# Patient Record
(demographics unavailable — no encounter records)

---

## 2025-01-16 NOTE — BEGINNING OF VISIT
[0] : 2) Feeling down, depressed, or hopeless: Not at all (0) [PHQ-2 Negative] : PHQ-2 Negative [[id number]] : 0 [Pain Scale: ___] : On a scale of 1-10, today the patient's pain is a(n) [unfilled]. [Former] : Former [20 or more] : 20 or more [> 15 Years] : > 15 Years [Date Discussed (MM/DD/YY): ___] : Discussed: [unfilled] [Reviewed, no changes] : Reviewed, no changes

## 2025-01-16 NOTE — BEGINNING OF VISIT
[0] : 2) Feeling down, depressed, or hopeless: Not at all (0) [PHQ-2 Negative] : PHQ-2 Negative [[id number]] : 0 [Pain Scale: ___] : On a scale of 1-10, today the patient's pain is a(n) [unfilled]. [Former] : Former [20 or more] : 20 or more [> 15 Years] : > 15 Years [Date Discussed (MM/DD/YY): ___] : Discussed: [unfilled] [Reviewed, no changes] : Reviewed, no changes

## 2025-01-16 NOTE — BEGINNING OF VISIT
[0] : 2) Feeling down, depressed, or hopeless: Not at all (0) [PHQ-2 Negative] : PHQ-2 Negative [[id number]] : 0 [Pain Scale: ___] : On a scale of 1-10, today the patient's pain is a(n) [unfilled]. [Former] : Former [20 or more] : 20 or more [> 15 Years] : > 15 Years [Date Discussed (MM/DD/YY): ___] : Discussed: [unfilled] [Reviewed, no changes] : Reviewed, no changes

## 2025-01-16 NOTE — BEGINNING OF VISIT
[0] : 2) Feeling down, depressed, or hopeless: Not at all (0) [PHQ-2 Negative] : PHQ-2 Negative [[id number]] : 0 [Pain Scale: ___] : On a scale of 1-10, today the patient's pain is a(n) [unfilled]. [Former] : Former [20 or more] : 20 or more [> 15 Years] : > 15 Years [Date Discussed (MM/DD/YY): ___] : Discussed: [unfilled] [Reviewed, no changes] : Reviewed, no changes

## 2025-01-17 NOTE — CONSULT LETTER
[Dear  ___] : Dear  [unfilled], [Consult Letter:] : I had the pleasure of evaluating your patient, [unfilled]. [Please see my note below.] : Please see my note below. [Consult Closing:] : Thank you very much for allowing me to participate in the care of this patient.  If you have any questions, please do not hesitate to contact me. [Sincerely,] : Sincerely, [FreeTextEntry3] : Melida Zapata MD\par  [facility] [place] at [facility]\par

## 2025-01-17 NOTE — REVIEW OF SYSTEMS
[SOB on Exertion] : shortness of breath during exertion [FreeTextEntry5] : leaky valve [de-identified] : right arm lymphedema - trace [Joint Stiffness] : joint stiffness [Muscle Pain] : muscle pain [Muscle Weakness] : muscle weakness [Negative] : Integumentary [Fatigue] : no fatigue [FreeTextEntry9] : neck pain

## 2025-01-17 NOTE — CONSULT LETTER
[Dear  ___] : Dear  [unfilled], [Consult Letter:] : I had the pleasure of evaluating your patient, [unfilled]. [Please see my note below.] : Please see my note below. [Consult Closing:] : Thank you very much for allowing me to participate in the care of this patient.  If you have any questions, please do not hesitate to contact me. [Sincerely,] : Sincerely, [FreeTextEntry3] : Melida Zapata MD\par  [facility] [place] at [facility]\par

## 2025-01-17 NOTE — ASSESSMENT
[FreeTextEntry1] : BCA - stage 1- 1994, s/p mastectomy, right implant, h/o rupture, replacement in the past- since then recurrent lymphedema, intermittent right upper extremity.  Improves with PT. Patient with implant over 10 years - not interested in exchanging Mammogram Aug 2021- due Aug 2022- Rx given MRI Jan 2020- implant 26 yo, stable   [employer] COVID vaccinated Flu vaccine - done   vit D deficiency - vit D oral 2k, monitor the dose   vit B12 borderline in the past  - oral B12 1000 mcg, stop as B12 > 2000 mg anemia- hg 11.4 - check irons today and B12 today   last bone density May 2019- due now  - due now, rx given   T-score -1.2 Risk factors: postmenopausal  Recommended: 1. Vitamin D 2. Calcium supplement 500mg 3. Weight bearing exercises  # Neck pain with radiculopathy - MRI neck   CT Jan 2021 -CT of the chest- discrete RUL lung nodule decreased in size. Other small nodular densities which were new on that study are stable to slightly less prominent.  There is a new 7 x 4mm irregular density within the right upper lobe which is likely infectious/ inflammatory with a significant lung nodule considered less likely but not excluded.  Other small nodular densities demonstrate long term stability.  F/U noncontrast CT in 3 months recommended.-- CT - scan repeated 4/14/2021, some nodules resolved, but given smoking will continue surveillance  - episodes of SOB - undergoing pulmonary evaluation CT chest - reviewed - slight in bud inflammatory process, pulmonary follow up, episodes of SOB - 1.5 packs x 35 years, quit 35 years ago - pt to return in 6 months, cbc, chem, cea ca 27.29 irons  Blood drawn in office. Ordered and reviewed.

## 2025-01-17 NOTE — REVIEW OF SYSTEMS
[SOB on Exertion] : shortness of breath during exertion [FreeTextEntry5] : leaky valve [de-identified] : right arm lymphedema - trace [Joint Stiffness] : joint stiffness [Muscle Pain] : muscle pain [Muscle Weakness] : muscle weakness [Negative] : Integumentary [Fatigue] : no fatigue [FreeTextEntry9] : neck pain

## 2025-01-17 NOTE — ASSESSMENT
[FreeTextEntry1] : BCA - stage 1- 1994, s/p mastectomy, right implant, h/o rupture, replacement in the past- since then recurrent lymphedema, intermittent right upper extremity.  Improves with PT. Patient with implant over 10 years - not interested in exchanging Mammogram Aug 2021- due Aug 2022- Rx given MRI Jan 2020- implant 24 yo, stable   [employer] COVID vaccinated Flu vaccine - done   vit D deficiency - vit D oral 2k, monitor the dose   vit B12 borderline in the past  - oral B12 1000 mcg, stop as B12 > 2000 mg anemia- hg 11.4 - check irons today and B12 today   last bone density May 2019- due now  - due now, rx given   T-score -1.2 Risk factors: postmenopausal  Recommended: 1. Vitamin D 2. Calcium supplement 500mg 3. Weight bearing exercises  # Neck pain with radiculopathy - MRI neck   CT Jan 2021 -CT of the chest- discrete RUL lung nodule decreased in size. Other small nodular densities which were new on that study are stable to slightly less prominent.  There is a new 7 x 4mm irregular density within the right upper lobe which is likely infectious/ inflammatory with a significant lung nodule considered less likely but not excluded.  Other small nodular densities demonstrate long term stability.  F/U noncontrast CT in 3 months recommended.-- CT - scan repeated 4/14/2021, some nodules resolved, but given smoking will continue surveillance  - episodes of SOB - undergoing pulmonary evaluation CT chest - reviewed - slight in bud inflammatory process, pulmonary follow up, episodes of SOB - 1.5 packs x 35 years, quit 35 years ago - pt to return in 6 months, cbc, chem, cea ca 27.29 irons  Blood drawn in office. Ordered and reviewed.

## 2025-01-17 NOTE — HISTORY OF PRESENT ILLNESS
[Disease: _____________________] : Disease: [unfilled] [AJCC Stage: ____] : AJCC Stage: [unfilled] [de-identified] : BCA stage 1 1994 right mastectomy.  h/o ruptured implant in the past.  Developed lymphedema Rt arm.  Therapies in the past, minimal result.  Mild swelling now [de-identified] : Patient presents today for follow up of breast cancer- lung nodule - anemia.  Patient states that she has severe pain in the right side of her neck and shoulder.  Last DEXA 2019

## 2025-01-17 NOTE — HISTORY OF PRESENT ILLNESS
[Disease: _____________________] : Disease: [unfilled] [AJCC Stage: ____] : AJCC Stage: [unfilled] [de-identified] : BCA stage 1 1994 right mastectomy.  h/o ruptured implant in the past.  Developed lymphedema Rt arm.  Therapies in the past, minimal result.  Mild swelling now [de-identified] : Patient presents today for follow up of breast cancer- lung nodule - anemia.  Patient states that she has severe pain in the right side of her neck and shoulder.  Last DEXA 2019

## 2025-01-17 NOTE — PHYSICAL EXAM
[Fully active, able to carry on all pre-disease performance without restriction] : Status 0 - Fully active, able to carry on all pre-disease performance without restriction [Normal] : affect appropriate [de-identified] : right mastectomy with reconstruction

## 2025-01-17 NOTE — REVIEW OF SYSTEMS
[SOB on Exertion] : shortness of breath during exertion [FreeTextEntry5] : leaky valve [de-identified] : right arm lymphedema - trace [Joint Stiffness] : joint stiffness [Muscle Pain] : muscle pain [Muscle Weakness] : muscle weakness [Negative] : Integumentary [Fatigue] : no fatigue [FreeTextEntry9] : neck pain

## 2025-01-17 NOTE — DISCUSSION/SUMMARY
[FreeTextEntry1] : Attempted to return patients call x 3 left voicemails- mailed rx for aug mammogram and CT chest to her home

## 2025-01-17 NOTE — PHYSICAL EXAM
[Fully active, able to carry on all pre-disease performance without restriction] : Status 0 - Fully active, able to carry on all pre-disease performance without restriction [Normal] : affect appropriate [de-identified] : right mastectomy with reconstruction

## 2025-01-17 NOTE — PHYSICAL EXAM
[Fully active, able to carry on all pre-disease performance without restriction] : Status 0 - Fully active, able to carry on all pre-disease performance without restriction [Normal] : affect appropriate [de-identified] : right mastectomy with reconstruction

## 2025-01-17 NOTE — HISTORY OF PRESENT ILLNESS
[Disease: _____________________] : Disease: [unfilled] [AJCC Stage: ____] : AJCC Stage: [unfilled] [de-identified] : BCA stage 1 1994 right mastectomy.  h/o ruptured implant in the past.  Developed lymphedema Rt arm.  Therapies in the past, minimal result.  Mild swelling now [de-identified] : Patient presents today for follow up of breast cancer- lung nodule - anemia.  Patient states that she has severe pain in the right side of her neck and shoulder.  Last DEXA 2019

## 2025-01-17 NOTE — CONSULT LETTER
[Dear  ___] : Dear  [unfilled], [Consult Letter:] : I had the pleasure of evaluating your patient, [unfilled]. [Please see my note below.] : Please see my note below. [Consult Closing:] : Thank you very much for allowing me to participate in the care of this patient.  If you have any questions, please do not hesitate to contact me. [Sincerely,] : Sincerely, [FreeTextEntry3] : Melida Zapata MD\par  [facility] [place] at [facility]\par

## 2025-01-17 NOTE — REVIEW OF SYSTEMS
[SOB on Exertion] : shortness of breath during exertion [FreeTextEntry5] : leaky valve [de-identified] : right arm lymphedema - trace [Joint Stiffness] : joint stiffness [Muscle Pain] : muscle pain [Muscle Weakness] : muscle weakness [Negative] : Integumentary [Fatigue] : no fatigue [FreeTextEntry9] : neck pain

## 2025-01-17 NOTE — PHYSICAL EXAM
[Fully active, able to carry on all pre-disease performance without restriction] : Status 0 - Fully active, able to carry on all pre-disease performance without restriction [Normal] : affect appropriate [de-identified] : right mastectomy with reconstruction

## 2025-01-17 NOTE — CONSULT LETTER
[Dear  ___] : Dear  [unfilled], [Consult Letter:] : I had the pleasure of evaluating your patient, [unfilled]. [Please see my note below.] : Please see my note below. [Consult Closing:] : Thank you very much for allowing me to participate in the care of this patient.  If you have any questions, please do not hesitate to contact me. [Sincerely,] : Sincerely, [FreeTextEntry3] : Melida Zapata MD\par  [facility] [place] at [facility]\par

## 2025-01-17 NOTE — HISTORY OF PRESENT ILLNESS
[Disease: _____________________] : Disease: [unfilled] [AJCC Stage: ____] : AJCC Stage: [unfilled] [de-identified] : BCA stage 1 1994 right mastectomy.  h/o ruptured implant in the past.  Developed lymphedema Rt arm.  Therapies in the past, minimal result.  Mild swelling now [de-identified] : Patient presents today for follow up of breast cancer- lung nodule - anemia.  Patient states that she has severe pain in the right side of her neck and shoulder.  Last DEXA 2019

## 2025-03-27 NOTE — PHYSICAL EXAM
[General Appearance - Alert] : alert [General Appearance - In No Acute Distress] : in no acute distress [Fluency] : fluency intact [Comprehension] : comprehension intact [Cranial Nerves Optic (II)] : visual acuity intact bilaterally,  pupils equal round and reactive to light [Cranial Nerves Oculomotor (III)] : extraocular motion intact [Cranial Nerves Trigeminal (V)] : facial sensation intact symmetrically [Cranial Nerves Facial (VII)] : face symmetrical [Cranial Nerves Vestibulocochlear (VIII)] : hearing was intact bilaterally [Cranial Nerves Glossopharyngeal (IX)] : tongue and palate midline [Cranial Nerves Accessory (XI - Cranial And Spinal)] : head turning and shoulder shrug symmetric [Cranial Nerves Hypoglossal (XII)] : there was no tongue deviation with protrusion [Motor Strength] : muscle strength was normal in all four extremities [Sensation Tactile Decrease] : light touch was intact [Abnormal Walk] : normal gait [Normal] : normal [3+] : Triceps left 3+ [2+] : Brachioradialis right 2+ [FreeTextEntry6] : left HF 4+ [de-identified] : myelopathic gait

## 2025-03-27 NOTE — ASSESSMENT
[FreeTextEntry1] : DANYELLE ALLEN is a 93 year old female with a PMH of breast cancer stage 1 right mastectomy and right implant, macular degeneration, lung nodule, last DEXA 2019 T score -1.2, Vitamin B12 deficiency, heart murmur, aortic valve insufficiency, who presents to the office today for neurosurgical consultation at the request of Dr. Zapata due to cervical stenosis.  I reviewed her MRI cervical spine in the office today. She has multilevel cervical spondylosis, most significant at C3-4, C4-5, and C5-6 where there is spinal cord compression and resulting foraminal stenosis as well.   I have discussed the natural history and treatment options for cervical myelopathy with the patient. I explained the indications for observation, conservative management, medical management, physical therapy, pain management approaches and surgery. I explained the different types and surgical approaches including anterior and posterior approaches as well as decompression only procedures and instrumented fusions.   In the end, I recommend additional imaging in the form of upright cervical spine X-rays with flexion and extension views to demonstrate the regional spinal alignment and to rule out any visible dynamic instability. The patient may return to the office once imaging completed for further treatment planning. I encouraged her to begin conversations with her PCP as well as cardiologist regarding medical optimization and possible surgical planning. She was also advised to reach out to us sooner if she is to develop weakness or bowel/bladder symptoms.  I spent 60 minutes relative to this patient encounter.

## 2025-03-27 NOTE — HISTORY OF PRESENT ILLNESS
[de-identified] : DANYELLE ALLEN is a 93 year old female with a PMH of breast cancer stage 1 right mastectomy and right implant, heart murmur, macular degeneration, lung nodule, last DEXA 2019 T score -1.2, Vitamin B12 deficiency, aortic valve insufficiency, who presents to the office today for neurosurgical consultation at the request of Dr. Zapata due to cervical stenosis.  The pain is characterized as sharp and tingling in nature.  It started few months ago. It is exacerbated by lying flat and relieved by lying on her side with her hand under her head.  It radiates from right side of neck into shoulder and right Tricep. She reports dexterity issues left> right hand (started a few months ago with buttons, zippers.  There has been no known trauma precipitating the pain.  The patient denies numbness of extremities, weakness of extremities, bowel or bladder incontinence. Endorses gait disturbance for the past few months with worsening balance, described as her brain and feet don't communicate.  She has tried pain medications including OTC in the last month without relief.  She has undergone imaging in the form of MRI cervical spine which revealed multilevel disc disease and degenerative changes. Spinal stenosis and neural foraminal stenosis at the C3-C4, C4-C5, and C5-C6. Cord compression at the C4-C5 level. Deformity of the cord at the C3-C4 and C5-C6 levels (see detailed report below).  Surg Hx:  right breast mastectomy, L5-L6 microdiscectomy  Meds:    Aspirin 81 mg, Epinephrine, Coreg, Furosemide, B12, Rosuvastatin, Vitamin D, Perservision  Allergies: cortisone, iodine  Soc Hx: Former smoker 1.5 packs x 35 years quit 40 years ago, daily cocktail EtOH, lives with  (has Parkinson's)

## 2025-03-27 NOTE — HISTORY OF PRESENT ILLNESS
[de-identified] : DANYELLE ALLEN is a 93 year old female with a PMH of breast cancer stage 1 right mastectomy and right implant, heart murmur, macular degeneration, lung nodule, last DEXA 2019 T score -1.2, Vitamin B12 deficiency, aortic valve insufficiency, who presents to the office today for neurosurgical consultation at the request of Dr. Zapata due to cervical stenosis.  The pain is characterized as sharp and tingling in nature.  It started few months ago. It is exacerbated by lying flat and relieved by lying on her side with her hand under her head.  It radiates from right side of neck into shoulder and right Tricep. She reports dexterity issues left> right hand (started a few months ago with buttons, zippers.  There has been no known trauma precipitating the pain.  The patient denies numbness of extremities, weakness of extremities, bowel or bladder incontinence. Endorses gait disturbance for the past few months with worsening balance, described as her brain and feet don't communicate.  She has tried pain medications including OTC in the last month without relief.  She has undergone imaging in the form of MRI cervical spine which revealed multilevel disc disease and degenerative changes. Spinal stenosis and neural foraminal stenosis at the C3-C4, C4-C5, and C5-C6. Cord compression at the C4-C5 level. Deformity of the cord at the C3-C4 and C5-C6 levels (see detailed report below).  Surg Hx:  right breast mastectomy, L5-L6 microdiscectomy  Meds:    Aspirin 81 mg, Epinephrine, Coreg, Furosemide, B12, Rosuvastatin, Vitamin D, Perservision  Allergies: cortisone, iodine  Soc Hx: Former smoker 1.5 packs x 35 years quit 40 years ago, daily cocktail EtOH, lives with  (has Parkinson's)

## 2025-03-27 NOTE — PHYSICAL EXAM
[General Appearance - Alert] : alert [General Appearance - In No Acute Distress] : in no acute distress [Fluency] : fluency intact [Comprehension] : comprehension intact [Cranial Nerves Optic (II)] : visual acuity intact bilaterally,  pupils equal round and reactive to light [Cranial Nerves Oculomotor (III)] : extraocular motion intact [Cranial Nerves Trigeminal (V)] : facial sensation intact symmetrically [Cranial Nerves Facial (VII)] : face symmetrical [Cranial Nerves Vestibulocochlear (VIII)] : hearing was intact bilaterally [Cranial Nerves Glossopharyngeal (IX)] : tongue and palate midline [Cranial Nerves Accessory (XI - Cranial And Spinal)] : head turning and shoulder shrug symmetric [Cranial Nerves Hypoglossal (XII)] : there was no tongue deviation with protrusion [Motor Strength] : muscle strength was normal in all four extremities [Sensation Tactile Decrease] : light touch was intact [Abnormal Walk] : normal gait [Normal] : normal [3+] : Triceps left 3+ [2+] : Brachioradialis right 2+ [FreeTextEntry6] : left HF 4+ [de-identified] : myelopathic gait

## 2025-03-27 NOTE — HISTORY OF PRESENT ILLNESS
[de-identified] : DANYELLE ALLEN is a 93 year old female with a PMH of breast cancer stage 1 right mastectomy and right implant, heart murmur, macular degeneration, lung nodule, last DEXA 2019 T score -1.2, Vitamin B12 deficiency, aortic valve insufficiency, who presents to the office today for neurosurgical consultation at the request of Dr. Zapata due to cervical stenosis.  The pain is characterized as sharp and tingling in nature.  It started few months ago. It is exacerbated by lying flat and relieved by lying on her side with her hand under her head.  It radiates from right side of neck into shoulder and right Tricep. She reports dexterity issues left> right hand (started a few months ago with buttons, zippers.  There has been no known trauma precipitating the pain.  The patient denies numbness of extremities, weakness of extremities, bowel or bladder incontinence. Endorses gait disturbance for the past few months with worsening balance, described as her brain and feet don't communicate.  She has tried pain medications including OTC in the last month without relief.  She has undergone imaging in the form of MRI cervical spine which revealed multilevel disc disease and degenerative changes. Spinal stenosis and neural foraminal stenosis at the C3-C4, C4-C5, and C5-C6. Cord compression at the C4-C5 level. Deformity of the cord at the C3-C4 and C5-C6 levels (see detailed report below).  Surg Hx:  right breast mastectomy, L5-L6 microdiscectomy  Meds:    Aspirin 81 mg, Epinephrine, Coreg, Furosemide, B12, Rosuvastatin, Vitamin D, Perservision  Allergies: cortisone, iodine  Soc Hx: Former smoker 1.5 packs x 35 years quit 40 years ago, daily cocktail EtOH, lives with  (has Parkinson's)

## 2025-03-27 NOTE — PHYSICAL EXAM
[General Appearance - Alert] : alert [General Appearance - In No Acute Distress] : in no acute distress [Fluency] : fluency intact [Comprehension] : comprehension intact [Cranial Nerves Optic (II)] : visual acuity intact bilaterally,  pupils equal round and reactive to light [Cranial Nerves Oculomotor (III)] : extraocular motion intact [Cranial Nerves Trigeminal (V)] : facial sensation intact symmetrically [Cranial Nerves Facial (VII)] : face symmetrical [Cranial Nerves Vestibulocochlear (VIII)] : hearing was intact bilaterally [Cranial Nerves Glossopharyngeal (IX)] : tongue and palate midline [Cranial Nerves Accessory (XI - Cranial And Spinal)] : head turning and shoulder shrug symmetric [Cranial Nerves Hypoglossal (XII)] : there was no tongue deviation with protrusion [Motor Strength] : muscle strength was normal in all four extremities [Sensation Tactile Decrease] : light touch was intact [Abnormal Walk] : normal gait [Normal] : normal [3+] : Triceps left 3+ [2+] : Brachioradialis right 2+ [FreeTextEntry6] : left HF 4+ [de-identified] : myelopathic gait

## 2025-03-27 NOTE — CONSULT LETTER
[Dear  ___] : Dear  [unfilled], [Consult Letter:] : I had the pleasure of evaluating your patient, [unfilled]. [Please see my note below.] : Please see my note below. [Consult Closing:] : Thank you very much for allowing me to participate in the care of this patient.  If you have any questions, please do not hesitate to contact me. [Sincerely,] : Sincerely, [DrMeron  ___] : Dr. SAM [FreeTextEntry3] : Tremayne Yoder M.D.

## 2025-04-07 NOTE — DATA REVIEWED
[de-identified] : MRI cervical spine 2/19/2025 - multilevel disc disease and degenerative changes. Spinal stenosis and neural foraminal stenosis at the C3-C4, C4-C5, and C5-C6 levels. Cord compression hermilo the C4-C5 level. Deformity of the cord qat the C3-4 and C5-6 levels.  [de-identified] : Cervical xray flex/ex 3/27/2025 - slight dextroscoliosis. No dynamic instability with no movement between flexion and extension. Moderate to severe disc degeneration. Mild bony neural foraminal encroachment. No fracture. Bilateral paraspinal calcifications likely related to carotid atherosclerosis.

## 2025-04-07 NOTE — PHYSICAL EXAM
[Over the Past 2 Weeks, Have You Felt Down, Depressed, or Hopeless?] : 1.) Over the past 2 weeks, have you felt down, depressed, or hopeless? No [Over the Past 2 Weeks, Have You Felt Little Interest or Pleasure Doing Things?] : 2.) Over the past 2 weeks, have you felt little interest or pleasure doing things? No [Person] : oriented to person [Place] : oriented to place [Time] : oriented to time [Short Term Intact] : short term memory intact [Remote Intact] : remote memory intact [Span Intact] : the attention span was normal [Concentration Intact] : normal concentrating ability [Fluency] : fluency intact [Comprehension] : comprehension intact [Current Events] : adequate knowledge of current events [Past History] : adequate knowledge of personal past history [Vocabulary] : adequate range of vocabulary [Cranial Nerves Optic (II)] : visual acuity intact bilaterally,  pupils equal round and reactive to light [Cranial Nerves Oculomotor (III)] : extraocular motion intact [Cranial Nerves Trigeminal (V)] : facial sensation intact symmetrically [Cranial Nerves Facial (VII)] : face symmetrical [Cranial Nerves Vestibulocochlear (VIII)] : hearing was intact bilaterally [Cranial Nerves Glossopharyngeal (IX)] : tongue and palate midline [Cranial Nerves Accessory (XI - Cranial And Spinal)] : head turning and shoulder shrug symmetric [Cranial Nerves Hypoglossal (XII)] : there was no tongue deviation with protrusion [Motor Tone] : muscle tone was normal in all four extremities [Motor Strength] : muscle strength was normal in all four extremities [No Muscle Atrophy] : normal bulk in all four extremities [Sensation Tactile Decrease] : light touch was intact [Abnormal Walk] : normal gait [Balance] : balance was intact [Past-pointing] : there was no past-pointing [Tremor] : no tremor present [2+] : Patella left 2+

## 2025-04-07 NOTE — ASSESSMENT
[FreeTextEntry1] : Patient is able to perform most ADL's at this point and we recommend she monitor her symptoms for now. If symptoms worsen or new ones develop, we recommend that she return to our clinic to discuss surgical intervention (C3-C6 laminectomy with C3-T1 fusion).   Patient and patient's family verbalize agreement and understanding with plan of care.   I, Dr. Jennings, personally performed the evaluation and management (E/M) services for this new patient. That E/M includes conducting the initial examination, assessing all conditions, and establishing the plan of care. Today, my ACP, Fab Lindsey, was here to observe my evaluation and management services for this patient to be followed going forward.

## 2025-04-07 NOTE — REASON FOR VISIT
[Second Opinion] : a second opinion [FreeTextEntry1] : multilevel cervical spondylosis, most significant at C3-4, C4-5, and C5-6 where there is spinal cord compression and resulting foraminal stenosis as well.

## 2025-04-07 NOTE — HISTORY OF PRESENT ILLNESS
[de-identified] : 93 year old female with a PMH of breast cancer stage 1 right mastectomy and right implant, heart murmur, macular degeneration, lung nodule, last DEXA 2019 T score -1.2, Vitamin B12 deficiency, aortic valve insufficiency, who presented initially to Dr. Yoder for neurosurgical consultation at the request of Dr. Zapata due to cervical stenosis.  The pain is characterized as sharp and tingling in nature. It radiates from right side of neck into shoulder and right tricep. She reports dexterity issues left> right hand (started a few months ago with buttons, zippers. There has been no known trauma precipitating the pain). She also endorses numbness in right index finger.  She reports gait disturbance for the past few months with worsening balance.  She has undergone imaging in the form of MRI cervical spine which revealed multilevel disc disease and degenerative changes. Spinal stenosis and neural foraminal stenosis at the C3-C4, C4-C5, and C5-C6. Cord compression at the C4-C5 level. Deformity of the cord at the C3-C4 and C5-C6 levels.   Seen by Dr. Yoder on 3/26/2025 and recommended she undergo additional imaging (upright cervical spine x rays with flex/ex, return to office to discuss surgical planning.  Surg Hx: right breast mastectomy, L5-L6 microdiscectomy  Stopped taking ASA 81mg daily  Soc Hx: Former smoker 1.5 packs x 35 years quit 40 years ago, daily cocktail EtOH, lives with  (has Parkinson's)

## 2025-04-18 NOTE — ASSESSMENT
[FreeTextEntry1] : DANYELLE ALLEN is a 93 year old female with a PMH of breast cancer stage 1 right mastectomy and right implant, macular degeneration, lung nodule, osteopenia, Vitamin B12 deficiency, heart murmur, aortic valve insufficiency, presenting for follow up due to cervical stenosis.  I personally reviewed her imaging in the office today, including her new X-rays. She has multilevel cervical spondylosis, most significant at C3-4, C4-5, and C5-6 where there is spinal cord compression and resulting foraminal stenosis as well.  Her x-rays demonstrate relatively well-maintained cervical lordosis without any dynamic instability.  I have discussed the natural history and treatment options for cervical myelopathy with the patient. I explained the indications for observation, conservative management, medical management, physical therapy, pain management approaches and surgery. I explained the different types and surgical approaches including anterior and posterior approaches as well as decompression only procedures and instrumented fusions.   In the end I recommend surgical intervention in the form of C3-C6 laminectomy for spinal cord and nerve root decompression.  The patient will need cardiac clearance and will need NP clearance with the Presurgical Testing Department at [place] prior to the procedure.  At the end of the discussion, the patient would like to think about her options and we will be in touch in the coming days.  I once again wanted to inform her on the risk of this severe cervical stenosis including the potential for an acute spinal cord injury in the setting of any acute hyperextension injury such as tripping and falling or being rear-ended.  The patient understands the plan of care and is in agreement.  All questions answered to patient satisfaction.  I spent 45 minutes relative to this patient encounter.

## 2025-04-18 NOTE — ASSESSMENT
[FreeTextEntry1] : DANYELLE ALLEN is a 93 year old female with a PMH of breast cancer stage 1 right mastectomy and right implant, macular degeneration, lung nodule, osteopenia, Vitamin B12 deficiency, heart murmur, aortic valve insufficiency, presenting for follow up due to cervical stenosis.  I personally reviewed her imaging in the office today, including her new X-rays. She has multilevel cervical spondylosis, most significant at C3-4, C4-5, and C5-6 where there is spinal cord compression and resulting foraminal stenosis as well.  Her x-rays demonstrate relatively well-maintained cervical lordosis without any dynamic instability.  I have discussed the natural history and treatment options for cervical myelopathy with the patient. I explained the indications for observation, conservative management, medical management, physical therapy, pain management approaches and surgery. I explained the different types and surgical approaches including anterior and posterior approaches as well as decompression only procedures and instrumented fusions.   In the end I recommend surgical intervention in the form of C3-C6 laminectomy for spinal cord and nerve root decompression.  The patient will need cardiac clearance and will need NP clearance with the Presurgical Testing Department at [place] prior to the procedure.  At the end of the discussion, the patient would like to think about her options and we will be in touch in the coming days.  I once again wanted to inform her on the risk of this severe cervical stenosis including the potential for an acute spinal cord injury in the setting of any acute hyperextension injury such as tripping and falling or being rear-ended.  The patient understands the plan of care and is in agreement.  All questions answered to patient satisfaction.  I spent 45 minutes relative to this patient encounter.

## 2025-04-18 NOTE — HISTORY OF PRESENT ILLNESS
[de-identified] : Danyelle Allen presents for follow up for cervical stenosis.  She underwent x-rays which demonstrated no dynamic instability and moderate to severe disc degeneration with relatively well maintained cervical lordosis. Mild bony neural foraminal encroachment. No fracture. Denies new neurological symptoms, however she does feel that she is having more posterior neck pain since our prior appointment.   3/26/25: DANYELLE ALLEN is a 93 year old female with a PMH of breast cancer stage 1 right mastectomy and right implant, heart murmur, macular degeneration, lung nodule, last DEXA 2019 T score -1.2, Vitamin B12 deficiency, aortic valve insufficiency, who presents to the office today for neurosurgical consultation at the request of Dr. Zapata due to cervical stenosis.  The pain is characterized as sharp and tingling in nature.  It started few months ago. It is exacerbated by lying flat and relieved by lying on her side with her hand under her head.  It radiates from right side of neck into shoulder and right Tricep. She reports dexterity issues left> right hand (started a few months ago with buttons, zippers.  There has been no known trauma precipitating the pain.  The patient denies numbness of extremities, weakness of extremities, bowel or bladder incontinence. Endorses gait disturbance for the past few months with worsening balance, described as her brain and feet don't communicate.  She has tried pain medications including OTC in the last month without relief.  She has undergone imaging in the form of MRI cervical spine which revealed multilevel disc disease and degenerative changes. Spinal stenosis and neural foraminal stenosis at the C3-C4, C4-C5, and C5-C6. Cord compression at the C4-C5 level. Deformity of the cord at the C3-C4 and C5-C6 levels (see detailed report below).  Surg Hx:  right breast mastectomy, L5-L6 microdiscectomy  Meds:    Aspirin 81 mg, Epinephrine, Coreg, Furosemide, B12, Rosuvastatin, Vitamin D, Perservision  Allergies: cortisone, iodine  Soc Hx: Former smoker 1.5 packs x 35 years quit 40 years ago, daily cocktail EtOH, lives with  (has Parkinson's)

## 2025-04-18 NOTE — ASSESSMENT
[FreeTextEntry1] : DANYELLE ALLEN is a 93 year old female with a PMH of breast cancer stage 1 right mastectomy and right implant, macular degeneration, lung nodule, osteopenia, Vitamin B12 deficiency, heart murmur, aortic valve insufficiency, presenting for follow up due to cervical stenosis.  I personally reviewed her imaging in the office today, including her new X-rays. She has multilevel cervical spondylosis, most significant at C3-4, C4-5, and C5-6 where there is spinal cord compression and resulting foraminal stenosis as well.  Her x-rays demonstrate relatively well-maintained cervical lordosis without any dynamic instability.  I have discussed the natural history and treatment options for cervical myelopathy with the patient. I explained the indications for observation, conservative management, medical management, physical therapy, pain management approaches and surgery. I explained the different types and surgical approaches including anterior and posterior approaches as well as decompression only procedures and instrumented fusions.   In the end I recommend surgical intervention in the form of C3-C6 laminectomy for spinal cord and nerve root decompression.  The patient will need cardiac clearance and will need NP clearance with the Presurgical Testing Department at [facility] prior to the procedure.  At the end of the discussion, the patient would like to think about her options and we will be in touch in the coming days.  I once again wanted to inform her on the risk of this severe cervical stenosis including the potential for an acute spinal cord injury in the setting of any acute hyperextension injury such as tripping and falling or being rear-ended.  The patient understands the plan of care and is in agreement.  All questions answered to patient satisfaction.  I spent 45 minutes relative to this patient encounter.

## 2025-04-18 NOTE — HISTORY OF PRESENT ILLNESS
[de-identified] : Danyelle Allen presents for follow up for cervical stenosis.  She underwent x-rays which demonstrated no dynamic instability and moderate to severe disc degeneration with relatively well maintained cervical lordosis. Mild bony neural foraminal encroachment. No fracture. Denies new neurological symptoms, however she does feel that she is having more posterior neck pain since our prior appointment.   3/26/25: DANYELLE ALLEN is a 93 year old female with a PMH of breast cancer stage 1 right mastectomy and right implant, heart murmur, macular degeneration, lung nodule, last DEXA 2019 T score -1.2, Vitamin B12 deficiency, aortic valve insufficiency, who presents to the office today for neurosurgical consultation at the request of Dr. Zapata due to cervical stenosis.  The pain is characterized as sharp and tingling in nature.  It started few months ago. It is exacerbated by lying flat and relieved by lying on her side with her hand under her head.  It radiates from right side of neck into shoulder and right Tricep. She reports dexterity issues left> right hand (started a few months ago with buttons, zippers.  There has been no known trauma precipitating the pain.  The patient denies numbness of extremities, weakness of extremities, bowel or bladder incontinence. Endorses gait disturbance for the past few months with worsening balance, described as her brain and feet don't communicate.  She has tried pain medications including OTC in the last month without relief.  She has undergone imaging in the form of MRI cervical spine which revealed multilevel disc disease and degenerative changes. Spinal stenosis and neural foraminal stenosis at the C3-C4, C4-C5, and C5-C6. Cord compression at the C4-C5 level. Deformity of the cord at the C3-C4 and C5-C6 levels (see detailed report below).  Surg Hx:  right breast mastectomy, L5-L6 microdiscectomy  Meds:    Aspirin 81 mg, Epinephrine, Coreg, Furosemide, B12, Rosuvastatin, Vitamin D, Perservision  Allergies: cortisone, iodine  Soc Hx: Former smoker 1.5 packs x 35 years quit 40 years ago, daily cocktail EtOH, lives with  (has Parkinson's)

## 2025-04-18 NOTE — ASSESSMENT
[FreeTextEntry1] : DANYELLE ALLEN is a 93 year old female with a PMH of breast cancer stage 1 right mastectomy and right implant, macular degeneration, lung nodule, osteopenia, Vitamin B12 deficiency, heart murmur, aortic valve insufficiency, presenting for follow up due to cervical stenosis.  I personally reviewed her imaging in the office today, including her new X-rays. She has multilevel cervical spondylosis, most significant at C3-4, C4-5, and C5-6 where there is spinal cord compression and resulting foraminal stenosis as well.  Her x-rays demonstrate relatively well-maintained cervical lordosis without any dynamic instability.  I have discussed the natural history and treatment options for cervical myelopathy with the patient. I explained the indications for observation, conservative management, medical management, physical therapy, pain management approaches and surgery. I explained the different types and surgical approaches including anterior and posterior approaches as well as decompression only procedures and instrumented fusions.   In the end I recommend surgical intervention in the form of C3-C6 laminectomy for spinal cord and nerve root decompression.  The patient will need cardiac clearance and will need NP clearance with the Presurgical Testing Department at [facility] prior to the procedure.  At the end of the discussion, the patient would like to think about her options and we will be in touch in the coming days.  I once again wanted to inform her on the risk of this severe cervical stenosis including the potential for an acute spinal cord injury in the setting of any acute hyperextension injury such as tripping and falling or being rear-ended.  The patient understands the plan of care and is in agreement.  All questions answered to patient satisfaction.  I spent 45 minutes relative to this patient encounter.

## 2025-04-18 NOTE — HISTORY OF PRESENT ILLNESS
[de-identified] : Danyelle Allen presents for follow up for cervical stenosis.  She underwent x-rays which demonstrated no dynamic instability and moderate to severe disc degeneration with relatively well maintained cervical lordosis. Mild bony neural foraminal encroachment. No fracture. Denies new neurological symptoms, however she does feel that she is having more posterior neck pain since our prior appointment.   3/26/25: DANYELLE ALLEN is a 93 year old female with a PMH of breast cancer stage 1 right mastectomy and right implant, heart murmur, macular degeneration, lung nodule, last DEXA 2019 T score -1.2, Vitamin B12 deficiency, aortic valve insufficiency, who presents to the office today for neurosurgical consultation at the request of Dr. Zapata due to cervical stenosis.  The pain is characterized as sharp and tingling in nature.  It started few months ago. It is exacerbated by lying flat and relieved by lying on her side with her hand under her head.  It radiates from right side of neck into shoulder and right Tricep. She reports dexterity issues left> right hand (started a few months ago with buttons, zippers.  There has been no known trauma precipitating the pain.  The patient denies numbness of extremities, weakness of extremities, bowel or bladder incontinence. Endorses gait disturbance for the past few months with worsening balance, described as her brain and feet don't communicate.  She has tried pain medications including OTC in the last month without relief.  She has undergone imaging in the form of MRI cervical spine which revealed multilevel disc disease and degenerative changes. Spinal stenosis and neural foraminal stenosis at the C3-C4, C4-C5, and C5-C6. Cord compression at the C4-C5 level. Deformity of the cord at the C3-C4 and C5-C6 levels (see detailed report below).  Surg Hx:  right breast mastectomy, L5-L6 microdiscectomy  Meds:    Aspirin 81 mg, Epinephrine, Coreg, Furosemide, B12, Rosuvastatin, Vitamin D, Perservision  Allergies: cortisone, iodine  Soc Hx: Former smoker 1.5 packs x 35 years quit 40 years ago, daily cocktail EtOH, lives with  (has Parkinson's)

## 2025-04-18 NOTE — HISTORY OF PRESENT ILLNESS
[de-identified] : Danyelle Allen presents for follow up for cervical stenosis.  She underwent x-rays which demonstrated no dynamic instability and moderate to severe disc degeneration with relatively well maintained cervical lordosis. Mild bony neural foraminal encroachment. No fracture. Denies new neurological symptoms, however she does feel that she is having more posterior neck pain since our prior appointment.   3/26/25: DANYELLE ALLEN is a 93 year old female with a PMH of breast cancer stage 1 right mastectomy and right implant, heart murmur, macular degeneration, lung nodule, last DEXA 2019 T score -1.2, Vitamin B12 deficiency, aortic valve insufficiency, who presents to the office today for neurosurgical consultation at the request of Dr. Zapata due to cervical stenosis.  The pain is characterized as sharp and tingling in nature.  It started few months ago. It is exacerbated by lying flat and relieved by lying on her side with her hand under her head.  It radiates from right side of neck into shoulder and right Tricep. She reports dexterity issues left> right hand (started a few months ago with buttons, zippers.  There has been no known trauma precipitating the pain.  The patient denies numbness of extremities, weakness of extremities, bowel or bladder incontinence. Endorses gait disturbance for the past few months with worsening balance, described as her brain and feet don't communicate.  She has tried pain medications including OTC in the last month without relief.  She has undergone imaging in the form of MRI cervical spine which revealed multilevel disc disease and degenerative changes. Spinal stenosis and neural foraminal stenosis at the C3-C4, C4-C5, and C5-C6. Cord compression at the C4-C5 level. Deformity of the cord at the C3-C4 and C5-C6 levels (see detailed report below).  Surg Hx:  right breast mastectomy, L5-L6 microdiscectomy  Meds:    Aspirin 81 mg, Epinephrine, Coreg, Furosemide, B12, Rosuvastatin, Vitamin D, Perservision  Allergies: cortisone, iodine  Soc Hx: Former smoker 1.5 packs x 35 years quit 40 years ago, daily cocktail EtOH, lives with  (has Parkinson's)